# Patient Record
Sex: MALE | URBAN - METROPOLITAN AREA
[De-identification: names, ages, dates, MRNs, and addresses within clinical notes are randomized per-mention and may not be internally consistent; named-entity substitution may affect disease eponyms.]

---

## 2023-03-23 ENCOUNTER — OFFICE VISIT (OUTPATIENT)
Dept: URBAN - METROPOLITAN AREA CLINIC 3 | Facility: CLINIC | Age: 28
End: 2023-03-23
Payer: COMMERCIAL

## 2023-03-23 DIAGNOSIS — H43.313 VITREOUS MEMBRANES AND STRANDS, BILATERAL: ICD-10-CM

## 2023-03-23 DIAGNOSIS — H18.623 KERATOCONUS, UNSTABLE, BILATERAL: Primary | ICD-10-CM

## 2023-03-23 PROCEDURE — 92025 CPTRIZED CORNEAL TOPOGRAPHY: CPT | Performed by: OPTOMETRIST

## 2023-03-23 PROCEDURE — 99204 OFFICE O/P NEW MOD 45 MIN: CPT | Performed by: OPTOMETRIST

## 2023-03-23 ASSESSMENT — INTRAOCULAR PRESSURE
OD: 11
OS: 11

## 2023-03-23 ASSESSMENT — KERATOMETRY: OS: 49.63

## 2023-03-23 NOTE — IMPRESSION/PLAN
Impression: Keratoconus, unstable, bilateral: T63.338. Plan: Confirmed with Pentacam , which shows posterior steepening Advised no rubbing. Refer for corneal crosslinking and repeat Pentacam and proceed with CXL if there is any progression.

## 2023-04-05 ENCOUNTER — OFFICE VISIT (OUTPATIENT)
Dept: URBAN - METROPOLITAN AREA CLINIC 3 | Facility: CLINIC | Age: 28
End: 2023-04-05
Payer: COMMERCIAL

## 2023-04-05 DIAGNOSIS — H18.623 KERATOCONUS, UNSTABLE, BILATERAL: Primary | ICD-10-CM

## 2023-04-05 PROCEDURE — 92025 CPTRIZED CORNEAL TOPOGRAPHY: CPT | Performed by: OPHTHALMOLOGY

## 2023-04-05 PROCEDURE — 99212 OFFICE O/P EST SF 10 MIN: CPT | Performed by: OPHTHALMOLOGY

## 2023-04-05 PROCEDURE — 76514 ECHO EXAM OF EYE THICKNESS: CPT | Performed by: OPHTHALMOLOGY

## 2023-04-05 ASSESSMENT — KERATOMETRY
OS: 49.25
OD: 53.80
OS: 48.95

## 2023-04-05 ASSESSMENT — VISUAL ACUITY: OD: 20/70

## 2023-04-05 ASSESSMENT — INTRAOCULAR PRESSURE
OD: 13
OS: 11

## 2023-04-05 NOTE — IMPRESSION/PLAN
Impression: Keratoconus, unstable, bilateral: V94.188. Plan: Keratoconus- Confirmed with repeat pentacam which shows posterior steepening that is worse compared to the last Pentacam. 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 Also the patient notices a rapid worsening of the vision. Recommended to proceed with collagen cross linking in the both eyes starting with the RIGHT EYE. R/B/A discussed with patient. Advised to discontinue eye rubbing and avoiding external pressure to eyes while sleeping even after procedure. PACH  PACH